# Patient Record
Sex: MALE | Race: WHITE | Employment: OTHER | URBAN - METROPOLITAN AREA
[De-identification: names, ages, dates, MRNs, and addresses within clinical notes are randomized per-mention and may not be internally consistent; named-entity substitution may affect disease eponyms.]

---

## 2019-04-02 ENCOUNTER — TRANSFERRED RECORDS (OUTPATIENT)
Dept: HEALTH INFORMATION MANAGEMENT | Facility: CLINIC | Age: 64
End: 2019-04-02

## 2019-04-15 ENCOUNTER — OFFICE VISIT (OUTPATIENT)
Dept: FAMILY MEDICINE | Facility: CLINIC | Age: 64
End: 2019-04-15
Payer: COMMERCIAL

## 2019-04-15 VITALS
OXYGEN SATURATION: 98 % | RESPIRATION RATE: 16 BRPM | DIASTOLIC BLOOD PRESSURE: 57 MMHG | SYSTOLIC BLOOD PRESSURE: 125 MMHG | HEIGHT: 66 IN | TEMPERATURE: 97.9 F | BODY MASS INDEX: 24.91 KG/M2 | HEART RATE: 73 BPM | WEIGHT: 155 LBS

## 2019-04-15 DIAGNOSIS — K57.32 DIVERTICULITIS OF COLON: ICD-10-CM

## 2019-04-15 DIAGNOSIS — Z90.49 S/P PARTIAL COLECTOMY: Primary | ICD-10-CM

## 2019-04-15 PROCEDURE — 99204 OFFICE O/P NEW MOD 45 MIN: CPT | Performed by: FAMILY MEDICINE

## 2019-04-15 ASSESSMENT — MIFFLIN-ST. JEOR: SCORE: 1430.83

## 2019-04-15 ASSESSMENT — PAIN SCALES - GENERAL: PAINLEVEL: MILD PAIN (2)

## 2019-04-15 NOTE — Clinical Note
02- Colonoscopy at [facility]/ [facility] everywhere / follow up 10 yrs

## 2019-04-15 NOTE — PROGRESS NOTES
"  SUBJECTIVE:   Kevin Sebastian is a 63 year old male who presents to clinic today for the following health issues:      Diverticulitis with perforated colon s/p partial colectomy.   Normal cbc/renal panel and lfts/lipase. ekg showed RBBB.   Given percocet and senokot at discharge.    History colonoscopy [facility] - no polyps.   History diverticulits x1 in past 3years ago.   Will likely get reanotsomis in 6months.   Colectomy in place. No pain. No bleeding. No fevers or chills. No urine changes. Appetite ok.  No current antibiotics. No nausea currently.   No chest pain or shortness of breath. Generally healthy.   No history mi/cva.   Emotionally doing.    Normal colonoscopy in past. Non-smoker. No history cancer.   Reviewed and updated as needed this visit by clinical staff         Reviewed and updated as needed this visit by Provider             Hospital Follow-up Visit:    Hospital/Nursing Home/IP Rehab Facility: [facility]  Date of Admission: 04-  Date of Discharge: 04-  Reason(s) for Admission: Diverticulitis        Coding guidelines for this visit:  Type of Medical   Decision Making Face-to-Face Visit       within 7 Days of discharge Face-to-Face Visit        within 14 days of discharge   Moderate Complexity [id number]   High Complexity [id number]              There is no problem list on file for this patient.    No past surgical history on file.    Social History     Tobacco Use     Smoking status: Never Smoker     Smokeless tobacco: Never Used   Substance Use Topics     Alcohol use: Yes     Alcohol/week: 8.4 oz     Types: 14 Cans of beer per week     History reviewed. No pertinent family history.        ROS:  All other ROS negative.     OBJECTIVE:     /57   Pulse 73   Temp 97.9  F (36.6  C) (Oral)   Resp 16   Ht 1.676 m (5' 6\")   Wt 70.3 kg (155 lb)   SpO2 98%   BMI 25.02 kg/m    Body mass index is 25.02 kg/m .  GENERAL: healthy, alert and no distress  EYES: Eyes " grossly normal to inspection, PERRL and conjunctivae and sclerae normal  HENT: ear canals and TM's normal, nose and mouth without ulcers or lesions  NECK: no adenopathy, no asymmetry, masses, or scars and thyroid normal to palpation  RESP: lungs clear to auscultation - no rales, rhonchi or wheezes  CV: regular rate and rhythm, normal S1 S2, no S3 or S4, no murmur, click or rub, no peripheral edema and peripheral pulses strong  ABDOMEN: soft, nontender, no hepatosplenomegaly, no masses and bowel sounds normal  ABDOMEN: incisions look clean. No active bleeding/pus. No surrounding redness.  MS: no gross musculoskeletal defects noted, no edema  NEURO: Normal strength and tone, mentation intact and speech normal  PSYCH: mentation appears normal, affect normal/bright      ASSESSMENT/PLAN:     ASSESSMENT / PLAN:  (Z90.49) S/P partial colectomy  (primary encounter diagnosis)  Comment: doing well. No signs of active bleeding/infection and appetite ok.   Plan: Expected course and warning signs reviewed. Call/email with questions/concerns. Signed up for my chart. Seeing surgery next week.     (K57.32) Diverticulitis of colon  Comment: s/p partial colonscopy  Plan: will try to get colonoscopy records but patient thinks no polyps.    Recheck in 6 months  For cpe/fasting labs/etc. Sooner if new issues/concerns.       Horace Rodriguez MD  [facility]

## 2019-04-23 ENCOUNTER — MEDICAL CORRESPONDENCE (OUTPATIENT)
Dept: HEALTH INFORMATION MANAGEMENT | Facility: CLINIC | Age: 64
End: 2019-04-23

## 2019-09-04 ENCOUNTER — TELEPHONE (OUTPATIENT)
Dept: FAMILY MEDICINE | Facility: CLINIC | Age: 64
End: 2019-09-04

## 2019-09-04 ENCOUNTER — OFFICE VISIT (OUTPATIENT)
Dept: FAMILY MEDICINE | Facility: CLINIC | Age: 64
End: 2019-09-04
Payer: COMMERCIAL

## 2019-09-04 VITALS
WEIGHT: 165 LBS | DIASTOLIC BLOOD PRESSURE: 78 MMHG | HEIGHT: 67 IN | BODY MASS INDEX: 25.9 KG/M2 | SYSTOLIC BLOOD PRESSURE: 128 MMHG | OXYGEN SATURATION: 97 % | HEART RATE: 65 BPM | TEMPERATURE: 98.5 F

## 2019-09-04 DIAGNOSIS — Z01.818 PREOP GENERAL PHYSICAL EXAM: Primary | ICD-10-CM

## 2019-09-04 DIAGNOSIS — Z87.19 HISTORY OF COLONIC DIVERTICULITIS: ICD-10-CM

## 2019-09-04 DIAGNOSIS — I45.10 RIGHT BUNDLE BRANCH BLOCK: ICD-10-CM

## 2019-09-04 DIAGNOSIS — Z93.3 S/P COLOSTOMY (H): ICD-10-CM

## 2019-09-04 PROCEDURE — 99214 OFFICE O/P EST MOD 30 MIN: CPT | Mod: 25 | Performed by: FAMILY MEDICINE

## 2019-09-04 PROCEDURE — 90471 IMMUNIZATION ADMIN: CPT | Performed by: FAMILY MEDICINE

## 2019-09-04 PROCEDURE — 93000 ELECTROCARDIOGRAM COMPLETE: CPT | Performed by: FAMILY MEDICINE

## 2019-09-04 PROCEDURE — 90714 TD VACC NO PRESV 7 YRS+ IM: CPT | Performed by: FAMILY MEDICINE

## 2019-09-04 RX ORDER — AMOXICILLIN 250 MG
1-4 CAPSULE ORAL DAILY
COMMUNITY
Start: 2019-04-10

## 2019-09-04 RX ORDER — DIPHENOXYLATE HYDROCHLORIDE AND ATROPINE SULFATE 2.5; .025 MG/1; MG/1
1 TABLET ORAL DAILY
COMMUNITY

## 2019-09-04 ASSESSMENT — MIFFLIN-ST. JEOR: SCORE: 1487.07

## 2019-09-04 NOTE — PROGRESS NOTES
[facility]  [address] [facility] [id number] [phone]  Dept: [phone]    PRE-OP EVALUATION:  Today's date: 2019     Kevin Sebastian (: 1955) presents for  pre-operative evaluation  assessment as requested by: Yadiel Howell MD    He requires evaluation and anesthesia risk assessment prior to undergoing surgery/procedure for take down of colostomy    Fax number for surgical facility: [facility]   Primary Physician: Horace Rodriguez  Type of Anesthesia Anticipated: General    Patient has a Health Care Directive or Living Will:  NO     Preop Questions 2019   Who is doing your surgery? [facility]   What are you having done? Take down colostomy    Date of Surgery/Procedure: 2019   Facility or Hospital where procedure/surgery will be performed: [facility]   1.  Do you have a history of Heart attack, stroke, stent, coronary bypass surgery, or other heart surgery? No   2.  Do you ever have any pain or discomfort in your chest? No   3.  Do you have a history of  Heart Failure? No   4.   Are you troubled by shortness of breath when:  walking on a level surface, or up a slight hill, or at night? No   5.  Do you currently have a cold, bronchitis or other respiratory infection? No   6.  Do you have a cough, shortness of breath, or wheezing? No   7.  Do you sometimes get pains in the calves of your legs when you walk? No   8. Do you or anyone in your family have previous history of blood clots? No   9.  Do you or does anyone in your family have a serious bleeding problem such as prolonged bleeding following surgeries or cuts? No   10. Have you ever had problems with anemia or been told to take iron pills? No   11. Have you had any abnormal blood loss such as black, tarry or bloody stools? No   12. Have you ever had a blood transfusion? No   13. Have you or any of your relatives ever had problems with anesthesia? No   14. Do you have sleep apnea,  excessive snoring or daytime drowsiness? YES -  snoring. Takes 10 minute naps frequently.   15. Do you have any prosthetic heart valves? No   16. Do you have prosthetic joints? No         HPI:     HPI related to upcoming procedure: diverticulitis and ended up with an emergency colectomy and ended up with a colostomy 4/2019  Doing well since then and now needed a take down colostomy       NO KNOWN CHRONIC MEDICAL PROBLEMS     MEDICAL HISTORY:   There are no active problems to display for this patient.    See above      No current outpatient medications on file.     OTC products: None, except as noted above    Allergies not on file   Latex Allergy: NO    Social History     Tobacco Use     Smoking status: Never Smoker     Smokeless tobacco: Never Used   Substance Use Topics     Alcohol use: Yes     Alcohol/week: 8.4 oz     Types: 14 Cans of beer per week     History   Drug Use Unknown       REVIEW OF SYSTEMS:   Constitutional, neuro, ENT, endocrine, pulmonary, cardiac, gastrointestinal, genitourinary, musculoskeletal, integument and psychiatric systems are negative, except as otherwise noted.    EXAM:   There were no vitals taken for this visit.    GENERAL APPEARANCE: healthy, alert and no distress     EYES: EOMI,  PERRL     HENT: ear canals and TM's normal and nose and mouth without ulcers or lesions     NECK: no adenopathy, no asymmetry, masses, or scars and thyroid normal to palpation     RESP: lungs clear to auscultation - no rales, rhonchi or wheezes     CV: regular rates and rhythm, normal S1 S2, no S3 or S4 and faint systolic murmur with occasional skipped beats, click or rub     ABDOMEN:  soft, nontender, no HSM or masses and bowel sounds normal     MS: extremities normal- no gross deformities noted, no evidence of inflammation in joints, FROM in all extremities.     SKIN: no suspicious lesions or rashes     NEURO: Normal strength and tone, sensory exam grossly normal, mentation intact and speech normal      PSYCH: mentation appears normal. and affect normal/bright     LYMPHATICS: No cervical adenopathy    DIAGNOSTICS:     EKG: normal axis, normal intervals, no acute ST/T changes c/w ischemia, no LVH by voltage criteria,   RIGHT BUNDLE BRANCH BLOCK    Labs Resulted Today:   Cbc bmp pending     IMPRESSION:   Reason for surgery/procedure: take down colostomy  Diagnosis/reason for consult: preop risk     The proposed surgical procedure is considered INTERMEDIATE risk.    REVISED CARDIAC RISK INDEX  The patient has the following serious cardiovascular risks for perioperative complications such as (MI, PE, VFib and 3  AV Block):    RBBB   Skipped beats      ICD-10-CM    1. Preop general physical exam Z01.818      ADMIN VACCINE, FIRST     Hepatitis C Screen Reflex to HCV RNA Quant and Genotype     CBC with platelets     Basic metabolic panel     TD PRESERV FREE, IM (7+ YRS)     EKG 12-lead complete w/read - Clinics     CARDIOLOGY EVAL ADULT REFERRAL   2. Right bundle branch block I45.10 CARDIOLOGY EVAL ADULT REFERRAL   3. S/P colostomy (H) Z93.3    4. History of colonic diverticulitis Z87.19          RECOMMENDATIONS:     Labs pending  Td updated  RBBB - patient reporting some signs or symptoms of ROBERTO. On auscultation faint heart murmur and skipped beats.  PATIENT does not come in for preventative visits although he did have a stress test in 2003 which result I am unable to access in Epic   Recommend Cardiology evaluation for further risk stratification and medical optimization.     Signed Electronically by: Tamiko Gore MD    Copy of this evaluation report is provided to requesting physician.    [facility] Preop Guidelines    Revised Cardiac Risk Index

## 2019-09-04 NOTE — NURSING NOTE
Prior to immunization administration, verified patients identity using patient s name and date of birth. Please see Immunization Activity for additional information.     Screening Questionnaire for Adult Immunization    Are you sick today?   No   Do you have allergies to medications, food, a vaccine component or latex?   No   Have you ever had a serious reaction after receiving a vaccination?   No   Do you have a long-term health problem with heart disease, lung disease, asthma, kidney disease, metabolic disease (e.g. diabetes), anemia, or other blood disorder?   No   Do you have cancer, leukemia, HIV/AIDS, or any other immune system problem?   No   In the past 3 months, have you taken medications that affect  your immune system, such as prednisone, other steroids, or anticancer drugs; drugs for the treatment of rheumatoid arthritis, Crohn s disease, or psoriasis; or have you had radiation treatments?   No   Have you had a seizure, or a brain or other nervous system problem?   No   During the past year, have you received a transfusion of blood or blood     products, or been given immune (gamma) globulin or antiviral drug?   No   For women: Are you pregnant or is there a chance you could become        pregnant during the next month?   No   Have you received any vaccinations in the past 4 weeks?   No     Immunization questionnaire answers were all negative.        Patient instructed to remain in clinic for 15 minutes afterwards, and to report any adverse reaction to me immediately.       Screening performed by Cierra Chavarria CMA on 9/4/2019 at 12:01 PM.

## 2019-09-04 NOTE — TELEPHONE ENCOUNTER
Patient was supposed to get labwork as well but it doesn't seem like this was done.  Please call patient and have him come in for labs preferably before his cardiology appointment so it will be in by the time he sees them  Thank you  Tamiko Gore M.D.

## 2019-09-05 NOTE — TELEPHONE ENCOUNTER
RECORDS RECEIVED FROM:   DATE RECEIVED:   NOTES STATUS DETAILS   OFFICE NOTE from referring provider    Internal 9-4-19 Dr. Gore   OFFICE NOTE from other cardiologist    N/A    DISCHARGE SUMMARY from hospital    N/A    DISCHARGE REPORT from the ER   N/A    OPERATIVE REPORT    N/A    MEDICATION LIST   Internal    LABS     BMP   Care Everywhere 4-7-19   CBC   Care Everywhere 4-7-19   CMP   Care Everywhere 4-1-19   Lipids   N/A    TSH   N/A    DIAGNOSTIC PROCEDURES     EKG   In process    Monitor Reports   N/A    IMAGING (DISC & REPORT)      Echo   N/A    Stress Tests   N/A    Cath   N/A    MRI/MRA   N/A    CT/CTA   N/A      Action    Action Taken 9-5: [facility]  9-5: Received strips and sent to scanning

## 2019-09-05 NOTE — PROGRESS NOTES
CARDIOLOGY CLINIC INITIAL CONSULTATION    Reason for consult: preoperative assessment prior to colostomy take down    HPI:  Mr. Sebastian is a 65 yo male with no known CVD history or traditional CVD risk factors. He was referred to cardiology due to a RBBB on his ECG.     He had a colectomy in April after an episode of diverticulitis. He has no perioperative CV complications. Although this event solidified his move into long-term, he remains active. 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 He goes for 30 minute bike rides 2-3 times/week. He denies any chest pain, dyspnea, palpitations, orthopnea, PND, LE edema, claudication.     His biggest health concern is difficulty with memory since retiring. He feels like his short term memory is worse. He feels some daytime fatigue and reports his wife has noticed that he snores at time. He has considered sleep testing in the past but has not scheduled anything.    Kevin is a never smoker and is on minimal medications.     ASSESSMENT AND PLAN  Mr. Kevin Sebastian is a 65 yo male in good cardiovascular health. He is active, able to perform well over 4 METS of activity without angina or heart failure symptoms. He is at low risk for a perioperative cardiovascular event. The RBBB on his ECG does not elevate that risk. He has no clear benefit from any additional cardiovascular workup prior to his surgery.     CVD Prevention: While Kevin has no striking CV risk factors and his blood pressure is well controlled, his age and sex are the primary drivers of increased risk. I will check a lipid panel today to help estimate his future risk. He would likely benefit from a moderate intensity statin which I am happy to prescribe. Alternatively, this decision could be made with his PCP, Dr. Rodriguez. Kevin would be a good candidate for evaluation in our [facility] if this is something he would like to  "pursue.     He describes some features of sleep apnea, so I encouraged him to discuss a sleep study referral from Dr. Rodriguez.     Finally, I will order a TSH to begin evaluation for memory issues. Additional workup can be pursued at the discretion of Dr. Rodriguez.    Thank you for the opportunity to participate in the care of Kevin Stanley. Please contact me with any additional questions or concerns. He can follow up with cardiology as needed.    Elijah Vegas MD    Preventive Cardiology  Pager: [phone]    PAST MEDICAL HISTORY:  Patient Active Problem List   Diagnosis     Benign prostatic hyperplasia     Dyslipidemia     Perforation of sigmoid colon due to diverticulitis     Osteoarthritis of both knees     No past medical history on file.    CURRENT MEDICATIONS:  Current Outpatient Medications   Medication Sig Dispense Refill     Multiple Vitamin (MULTI-VITAMINS) TABS Take 1 tablet by mouth daily       senna-docusate (SENOKOT-S/PERICOLACE) 8.6-50 MG tablet Take 1-4 tablets by mouth daily         PAST SURGICAL HISTORY:  No past surgical history on file.    ALLERGIES  Piperacillin-tazobactam in d5w    FAMILY HX:  Family History   Problem Relation Age of Onset     Other - See Comments Mother         valve replacement in 70s     Hyperlipidemia Mother        SOCIAL HX:  Social History     Tobacco Use     Smoking status: Never Smoker     Smokeless tobacco: Never Used   Substance Use Topics     Alcohol use: Yes     Alcohol/week: 8.4 oz     Types: 14 Cans of beer per week     Drug use: Never        ROS:  Comprehensive ROS is negative except as noted in HPI.    VITAL SIGNS:  /79 (BP Location: Left arm, Patient Position: Chair, Cuff Size: Adult Regular)   Pulse 64   Ht 1.702 m (5' 7\")   Wt 74.6 kg (164 lb 8 oz)   SpO2 97%   BMI 25.76 kg/m    Body mass index is 25.76 kg/m .  Wt Readings from Last 2 Encounters:   09/06/19 74.6 kg (164 lb 8 oz)   09/04/19 74.8 kg (165 lb)       PHYSICAL " EXAM  Gen: pleasant man sitting comfortably in NAD  Head: nc/at  Eyes: EOMI, PERRL  ENT: no OP lesions or erythema  Neck: supple, no LAD  CV: nml s1/s2, no murmur or gallop; no JVD  Chest: clear lungs  Abd: soft, NT, NABS  Ext: warm, no LE edema  Skin: no rash on limited exam  Neuro: awake, alert, oriented, nml speech and gait  Vasc: 2+ bilateral carotid, brachial, radial, DP and PT pulses; no bruits noted    LABS:   CMPNo lab results found.  CBCNo lab results found.  INRNo lab results found.  No lab results found.    Most recent EKG: personally reviewed and discussed with the patient   9/4/19 ECG: NSR, RBBB, no ischemia    Most recent ECHO:   NA    Most recent STRESS TEST:    2003 at [facility]: report not available.

## 2019-09-06 ENCOUNTER — OFFICE VISIT (OUTPATIENT)
Dept: CARDIOLOGY | Facility: CLINIC | Age: 64
End: 2019-09-06
Attending: FAMILY MEDICINE
Payer: COMMERCIAL

## 2019-09-06 ENCOUNTER — PRE VISIT (OUTPATIENT)
Dept: CARDIOLOGY | Facility: CLINIC | Age: 64
End: 2019-09-06

## 2019-09-06 VITALS
BODY MASS INDEX: 25.82 KG/M2 | WEIGHT: 164.5 LBS | DIASTOLIC BLOOD PRESSURE: 79 MMHG | HEIGHT: 67 IN | OXYGEN SATURATION: 97 % | HEART RATE: 64 BPM | SYSTOLIC BLOOD PRESSURE: 134 MMHG

## 2019-09-06 DIAGNOSIS — R53.83 FATIGUE, UNSPECIFIED TYPE: ICD-10-CM

## 2019-09-06 DIAGNOSIS — Z01.810 PRE-OPERATIVE CARDIOVASCULAR EXAMINATION: Primary | ICD-10-CM

## 2019-09-06 DIAGNOSIS — Z01.818 PREOP GENERAL PHYSICAL EXAM: ICD-10-CM

## 2019-09-06 DIAGNOSIS — Z01.810 PRE-OPERATIVE CARDIOVASCULAR EXAMINATION: ICD-10-CM

## 2019-09-06 PROBLEM — K57.20 PERFORATION OF SIGMOID COLON DUE TO DIVERTICULITIS: Status: ACTIVE | Noted: 2019-09-06

## 2019-09-06 LAB
ALBUMIN SERPL-MCNC: 4.2 G/DL (ref 3.4–5)
ALP SERPL-CCNC: 63 U/L (ref 40–150)
ALT SERPL W P-5'-P-CCNC: 27 U/L (ref 0–70)
ANION GAP SERPL CALCULATED.3IONS-SCNC: 5 MMOL/L (ref 3–14)
AST SERPL W P-5'-P-CCNC: 21 U/L (ref 0–45)
BILIRUB SERPL-MCNC: 0.9 MG/DL (ref 0.2–1.3)
BUN SERPL-MCNC: 18 MG/DL (ref 7–30)
CALCIUM SERPL-MCNC: 9.1 MG/DL (ref 8.5–10.1)
CHLORIDE SERPL-SCNC: 107 MMOL/L (ref 94–109)
CHOLEST SERPL-MCNC: 197 MG/DL
CO2 SERPL-SCNC: 26 MMOL/L (ref 20–32)
CREAT SERPL-MCNC: 0.98 MG/DL (ref 0.66–1.25)
ERYTHROCYTE [DISTWIDTH] IN BLOOD BY AUTOMATED COUNT: 12.5 % (ref 10–15)
GFR SERPL CREATININE-BSD FRML MDRD: 79 ML/MIN/{1.73_M2}
GLUCOSE SERPL-MCNC: 97 MG/DL (ref 70–99)
HCT VFR BLD AUTO: 47.2 % (ref 40–53)
HCV AB SERPL QL IA: NONREACTIVE
HDLC SERPL-MCNC: 49 MG/DL
HGB BLD-MCNC: 15.6 G/DL (ref 13.3–17.7)
LDLC SERPL CALC-MCNC: 129 MG/DL
MCH RBC QN AUTO: 31.2 PG (ref 26.5–33)
MCHC RBC AUTO-ENTMCNC: 33.1 G/DL (ref 31.5–36.5)
MCV RBC AUTO: 94 FL (ref 78–100)
NONHDLC SERPL-MCNC: 148 MG/DL
PLATELET # BLD AUTO: 155 10E9/L (ref 150–450)
POTASSIUM SERPL-SCNC: 4.3 MMOL/L (ref 3.4–5.3)
PROT SERPL-MCNC: 7.7 G/DL (ref 6.8–8.8)
RBC # BLD AUTO: 5 10E12/L (ref 4.4–5.9)
SODIUM SERPL-SCNC: 138 MMOL/L (ref 133–144)
TRIGL SERPL-MCNC: 92 MG/DL
TSH SERPL DL<=0.005 MIU/L-ACNC: 2.38 MU/L (ref 0.4–4)
WBC # BLD AUTO: 5.8 10E9/L (ref 4–11)

## 2019-09-06 PROCEDURE — 85027 COMPLETE CBC AUTOMATED: CPT | Performed by: INTERNAL MEDICINE

## 2019-09-06 PROCEDURE — 80053 COMPREHEN METABOLIC PANEL: CPT | Performed by: INTERNAL MEDICINE

## 2019-09-06 PROCEDURE — 99204 OFFICE O/P NEW MOD 45 MIN: CPT | Mod: ZP | Performed by: INTERNAL MEDICINE

## 2019-09-06 PROCEDURE — 80061 LIPID PANEL: CPT | Performed by: INTERNAL MEDICINE

## 2019-09-06 PROCEDURE — 84443 ASSAY THYROID STIM HORMONE: CPT | Performed by: INTERNAL MEDICINE

## 2019-09-06 PROCEDURE — 86803 HEPATITIS C AB TEST: CPT | Performed by: INTERNAL MEDICINE

## 2019-09-06 PROCEDURE — 36415 COLL VENOUS BLD VENIPUNCTURE: CPT | Performed by: INTERNAL MEDICINE

## 2019-09-06 ASSESSMENT — ENCOUNTER SYMPTOMS
DISTURBANCES IN COORDINATION: 0
DIZZINESS: 0
MYALGIAS: 1
MEMORY LOSS: 1
TINGLING: 1
EXERCISE INTOLERANCE: 0
INCREASED ENERGY: 1
NIGHT SWEATS: 0
MUSCLE WEAKNESS: 1
LIGHT-HEADEDNESS: 0
TREMORS: 0
JOINT SWELLING: 0
SPEECH CHANGE: 0
WEAKNESS: 1
CHILLS: 0
HEMOPTYSIS: 0
HYPOTENSION: 0
DECREASED APPETITE: 0
SPUTUM PRODUCTION: 0
SEIZURES: 0
POLYPHAGIA: 0
HYPERTENSION: 0
HALLUCINATIONS: 0
COUGH: 0
WEIGHT GAIN: 0
NECK PAIN: 1
WEIGHT LOSS: 0
LOSS OF CONSCIOUSNESS: 0
FEVER: 0
DYSPNEA ON EXERTION: 0
WHEEZING: 0
ARTHRALGIAS: 1
POLYDIPSIA: 0
POSTURAL DYSPNEA: 0
SNORES LOUDLY: 1
SHORTNESS OF BREATH: 0
ORTHOPNEA: 0
ALTERED TEMPERATURE REGULATION: 0
COUGH DISTURBING SLEEP: 0
PALPITATIONS: 1
BACK PAIN: 0
SYNCOPE: 0
HEADACHES: 0
SLEEP DISTURBANCES DUE TO BREATHING: 0
FATIGUE: 1
LEG PAIN: 0
MUSCLE CRAMPS: 0

## 2019-09-06 ASSESSMENT — MIFFLIN-ST. JEOR: SCORE: 1484.8

## 2019-09-06 ASSESSMENT — PAIN SCALES - GENERAL: PAINLEVEL: NO PAIN (0)

## 2019-09-06 NOTE — NURSING NOTE
Vitals completed successfully and medication reconciled.     Claire Pastor CMA  8:12 AM    Chief Complaint   Patient presents with     New Patient     reason for visit: present for cardiac clearance for colostomy closure

## 2019-09-06 NOTE — LETTER
9/6/2019      RE: Kevin Sebastian  [address] [facility] [address]       Dear Colleague,    Thank you for the opportunity to participate in the care of your patient, Kevin Sebastian, at the [facility]. Please see a copy of my visit note below.    CARDIOLOGY CLINIC INITIAL CONSULTATION    Reason for consult: preoperative assessment prior to colostomy take down    HPI:  Mr. Sebastian is a 63 yo male with no known CVD history or traditional CVD risk factors. He was referred to cardiology due to a RBBB on his ECG.     He had a colectomy in April after an episode of diverticulitis. He has no perioperative CV complications. Although this event solidified his move into [facility], he remains active. 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 He goes for 30 minute bike rides 2-3 times/week. He denies any chest pain, dyspnea, palpitations, orthopnea, PND, LE edema, claudication.     His biggest health concern is difficulty with memory since retiring. He feels like his short term memory is worse. He feels some daytime fatigue and reports his wife has noticed that he snores at time. He has considered sleep testing in the past but has not scheduled anything.    Kevin is a never smoker and is on minimal medications.     ASSESSMENT AND PLAN  Mr. Kevin Sebastian is a 63 yo male in good cardiovascular health. He is active, able to perform well over 4 METS of activity without angina or heart failure symptoms. He is at low risk for a perioperative cardiovascular event. The RBBB on his ECG does not elevate that risk. He has no clear benefit from any additional cardiovascular workup prior to his surgery.     CVD Prevention: While Kevin has no striking CV risk factors and his blood pressure is well controlled, his age and sex are the primary drivers of increased risk. I will check a lipid panel  today to help estimate his future risk. He would likely benefit from a moderate intensity statin which I am happy to prescribe. Alternatively, this decision could be made with his PCP, Dr. Rodriguez. Kevin would be a good candidate for evaluation in our [facility] if this is something he would like to pursue.     He describes some features of sleep apnea, so I encouraged him to discuss a sleep study referral from Dr. Rodriguez.     Finally, I will order a TSH to begin evaluation for memory issues. Additional workup can be pursued at the discretion of Dr. Rodriguez.    Thank you for the opportunity to participate in the care of Kevin Stanley. Please contact me with any additional questions or concerns. He can follow up with cardiology as needed.    Elijah Vegas MD    Preventive Cardiology  Pager: [phone]    PAST MEDICAL HISTORY:  Patient Active Problem List   Diagnosis     Benign prostatic hyperplasia     Dyslipidemia     Perforation of sigmoid colon due to diverticulitis     Osteoarthritis of both knees     No past medical history on file.    CURRENT MEDICATIONS:  Current Outpatient Medications   Medication Sig Dispense Refill     Multiple Vitamin (MULTI-VITAMINS) TABS Take 1 tablet by mouth daily       senna-docusate (SENOKOT-S/PERICOLACE) 8.6-50 MG tablet Take 1-4 tablets by mouth daily         PAST SURGICAL HISTORY:  No past surgical history on file.    ALLERGIES  Piperacillin-tazobactam in d5w    FAMILY HX:  Family History   Problem Relation Age of Onset     Other - See Comments Mother         valve replacement in 70s     Hyperlipidemia Mother        SOCIAL HX:  Social History     Tobacco Use     Smoking status: Never Smoker     Smokeless tobacco: Never Used   Substance Use Topics     Alcohol use: Yes     Alcohol/week: 8.4 oz     Types: 14 Cans of beer per week     Drug use: Never        ROS:  Comprehensive ROS is negative except as noted in HPI.    VITAL SIGNS:  /79 (BP  "Location: Left arm, Patient Position: Chair, Cuff Size: Adult Regular)   Pulse 64   Ht 1.702 m (5' 7\")   Wt 74.6 kg (164 lb 8 oz)   SpO2 97%   BMI 25.76 kg/m     Body mass index is 25.76 kg/m .  Wt Readings from Last 2 Encounters:   09/06/19 74.6 kg (164 lb 8 oz)   09/04/19 74.8 kg (165 lb)       PHYSICAL EXAM  Gen: pleasant man sitting comfortably in NAD  Head: nc/at  Eyes: EOMI, PERRL  ENT: no OP lesions or erythema  Neck: supple, no LAD  CV: nml s1/s2, no murmur or gallop; no JVD  Chest: clear lungs  Abd: soft, NT, NABS  Ext: warm, no LE edema  Skin: no rash on limited exam  Neuro: awake, alert, oriented, nml speech and gait  Vasc: 2+ bilateral carotid, brachial, radial, DP and PT pulses; no bruits noted    LABS:   CMPNo lab results found.  CBCNo lab results found.  INRNo lab results found.  No lab results found.    Most recent EKG: personally reviewed and discussed with the patient   9/4/19 ECG: NSR, RBBB, no ischemia    Most recent ECHO:   NA    Most recent STRESS TEST:    2003 at [facility]: report not available.      Please do not hesitate to contact me if you have any questions/concerns.     Sincerely,     Elijah Vegas MD    "

## 2019-09-06 NOTE — PATIENT INSTRUCTIONS
"You were seen today in the Cardiovascular Clinic at the [facility].     Cardiology Providers you saw during your visit: Dr. Ghada Vegas     Diagnosis:   Encounter Diagnoses   Name Primary?     Pre-operative cardiovascular examination Yes     Fatigue, unspecified type         Results: Discussed with you today EKG      Orders:   Orders Placed This Encounter   Procedures     Lipid panel reflex to direct LDL Fasting     TSH with free T4 reflex     Comprehensive metabolic panel       Current Medication List  Current Outpatient Medications   Medication Sig Dispense Refill     Multiple Vitamin (MULTI-VITAMINS) TABS Take 1 tablet by mouth daily       senna-docusate (SENOKOT-S/PERICOLACE) 8.6-50 MG tablet Take 1-4 tablets by mouth daily         Recommendations:   1. Get your cholesterol, electrolytes, liver function and thyroid labs checked today.  2. Talk with Dr. Rodriguez about a sleep study.  3. Continue to walk or ride your bike at least every other day.  4. Mediterranean diet.  5. Follow up in cardiology clinic as needed.     Please feel free to call me with any questions or concerns.       Mihai Sequeira LPN     Questions: [phone].   First press #1 for the [employer] and then press #3 for \"Medical Questions\" to reach us Cardiology Nurses.     [phone].   First press #1 for the [employer] and then press #1     On Call Cardiologist for after hours or on weekends: [phone]   option #4 and ask to speak to the on-call Cardiologist.          If you need a medication refill please contact your pharmacy.  Please allow 3 business days for your refill to be completed.  ________________________________________________________________________________________________________________________________      Patient Education     Mediterranean Diet  A heart healthy eating plan  The Mediterranean Diet is based on the eating habits of people in countries near the [place]. People living in " this part of the world have long lives and low rates of chronic diseases. They have lower rates of death from heart disease, cancer and other illnesses.  When you follow this eating plan you'll have better control of your blood sugar and weight. The plan requires simple changes in your habits of eating, and the whole family can take part.  Mediterranean lifestyle   Enjoy eating with others. Sit at the table with family and friends and enjoy your meal. When you eat slowly, you are able to tune in to your body's hunger and fullness signals. You're less likely to overeat.  Be physically active: Being active every day is important for overall good health. Run, walk, dance and do lighter activities such as house and yard work. Move more and sit less!  Drink plenty of water during the day.  Drink red wine with meals in modest amounts (optional).  The Mediterranean Pyramid   The pyramid shows the food groups and the amounts eaten in relation to the whole diet. It is more than a diet; it is also a life-style plan.  The largest food group at bottom contains plant foods: vegetables, fruits, grains, nuts, legumes, seeds, olives and olive oil. These foods make up the largest part of your meals.   The next groups above: fish and seafood, then poultry, cheese, eggs and yogurt are eaten less often and in smaller servings.   The top group, meats and sweets, are eaten the least often and in the smallest amounts.    Tips for adding plant foods to your meals   Vegetables and fruits:     Aim for 3 to 8 servings each day. A serving is   to 2 cups, depending on the food.    Choose a variety of colors. Big green salads are a great way to include several vegetable servings.    Try fresh fruit as a dessert: oranges, grapes, apples pomegranates or fresh figs.    At home keep fresh fruit in a bowl to tempt family.    Bring fruit and vegetables to work for a snack.  Oil     Replace butter and margarine with healthy fats such as olive oil.  "Other plant-based oils are canola, walnut and peanut oil. These are high in good fats. Use them in cooking, salad dressings and baking.    Drizzle your bread with olive oil instead of butter or margarine. Use herbs and spices to add flavor and aroma and to reduce fat and salt when cooking.  Whole grains    Look for the term \"whole\" or \"whole grain\" on the package. Processing grains removes vitamins, minerals and fiber. Whole grains may include: corn, wheat, oats, rye, rice and barley.    Examples of Mediterranean grains include: barley, farro, buckwheat, bulgur, couscous, and wheatberries.    Slowly switch to a whole grain by using whole-grain blends of pastas and rice. Or mix whole grains with refined; for example, mix whole-wheat pasta with white pasta.  Beans and legumes     Beans are a good source of protein and fiber, adding flavor and texture to dishes. Examples include cannellini beans, chickpea, kye beans, green beans, kidney beans, lentils and split peas.    Cook a vegetarian meal one night a week: use beans or legumes with vegetables and grains.    Nuts are high in healthy fats. Try walnuts, almonds, pine nuts, hazelnuts and cashews. Avoid candied, honey-roasted and heavily salted nuts.    Limit your intake of nuts to a small handful each day. Explore ways to add to nuts to salads and other dishes.  Tips for using fish and seafood in your meals    Aim for meals with fish or shellfish at least twice a week.    Tuna, herring, salmon and sardines are rich in heart-healthy omega-3. Shellfish, such as mussels, oysters and clams, have similar benefits for brain and heart health.  Tips for using poultry, eggs and cheese and yogurt in your meals    Eat poultry or eggs at least twice a week.    Roast, broil or grill your poultry. Season with fresh or dried herbs.    Enjoy low-fat cheese or yogurt every day.  Tips for using red meat and sweets in your meals     Limit lean red meat to one time a week or 4 times a " month.    Red meat has more saturated fats. Choose a lean cut, like top loin, sirloin, flank steak, strip steak or 90% lean ground beef.    Limit portions to 3 to 4 ounces.    Make whole grains and vegetables the main focus of a meal. Add meat in small amounts for flavor.    Limit sweets such as ice cream or cookies for a special times or holidays.  Snacks    Snack on a handful of almonds, walnuts or sunflower seeds in place of chips, cookies or other processed snack foods.    Calcium-rich, low-fat cheese or low- and nonfat plain yogurt with fresh fruit are healthy snacks that are easy to take with you.  Like to know more?  For tips on shoppping, cooking and eating well the Mediterranean way, go to the [employer] website at www.[employer].org.  For informational purposes only. Not to replace the advice of your health care provider.   Copyright   2014 [employer]. All rights reserved.   Mediterranean pyramrid used with permission of the [employer]. [employer] 894388 - 09/15.  For informational purposes only. Not to replace the advice of your health care provider.  Copyright   2018 [employer]. All rights reserved.

## 2019-09-11 ENCOUNTER — TELEPHONE (OUTPATIENT)
Dept: CARDIOLOGY | Facility: CLINIC | Age: 64
End: 2019-09-11

## 2019-09-11 NOTE — TELEPHONE ENCOUNTER
----- Message from Elijah Vegas MD sent at 9/9/2019  2:37 PM CDT -----  LDL cholesterol was mildly elevated. The rest of the results were normal and were released to the patient via [employer].

## 2019-09-17 ENCOUNTER — TELEPHONE (OUTPATIENT)
Dept: FAMILY MEDICINE | Facility: CLINIC | Age: 64
End: 2019-09-17

## 2019-09-17 NOTE — TELEPHONE ENCOUNTER
Fax number [phone]     Reason for Call:  EKG     Detailed comments: would like EKG results faxed to [phone]     Phone Number Patient can be reached at: Other phone number:  [phone]     Best Time: anytime    Can we leave a detailed message on this number? NO    Call taken on 9/17/2019 at 9:48 AM by Lindsey Madsen

## 2019-09-25 ENCOUNTER — TRANSFERRED RECORDS (OUTPATIENT)
Dept: HEALTH INFORMATION MANAGEMENT | Facility: CLINIC | Age: 64
End: 2019-09-25

## 2019-09-30 LAB
CREAT SERPL-MCNC: 0.79 MG/DL (ref 0.72–1.25)
GFR SERPL CREATININE-BSD FRML MDRD: >60 ML/MIN/1.73M2
GLUCOSE SERPL-MCNC: 109 MG/DL (ref 65–100)
POTASSIUM SERPL-SCNC: 4.3 MMOL/L (ref 3.5–5)

## 2019-10-04 NOTE — PROGRESS NOTES
Subjective     Kevin Sebastian is a 64 year old male who presents to clinic today for the following health issues:    [facility]       Hospital Follow-up Visit:    Hospital/Nursing Home/ Rehab Facility: [facility]  Date of Admission: 9/25/19  Date of Discharge: 10/4/19  Reason(s) for Admission: Perforation of sigmoid colon due to diverticulitis             Problems taking medications regularly:  None       Medication changes since discharge: None       Problems adhering to non-medication therapy:  None    Patient has questions about bowel movements  Summary of hospitalization:  [facility] discharge summary reviewed  Diagnostic Tests/Treatments reviewed.  Follow up needed: none  Other Healthcare Providers Involved in Patient s Care:         Surgical follow-up appointment - 101/10/2019  Update since discharge: improved.     Post Discharge Medication Reconciliation: discharge medications reconciled, continue medications without change.  Plan of care communicated with patient     Coding guidelines for this visit:  Type of Medical   Decision Making Face-to-Face Visit       within 7 Days of discharge Face-to-Face Visit        within 14 days of discharge   Moderate Complexity [id number]   High Complexity [id number]            Constipation     Onset: since discharge form the hospital    Description:   Frequency of bowel movements: had one small bowel movement yesterday  Stool consistency: small caliber    Progression of Symptoms:  improving    Accompanying Signs & Symptoms:  Abdominal pain (cramping?): YES  Blood in stool: no   Rectal pain: no   Nausea/vomiting: no   Weight loss or gain: no    History:   History of abdominal surgery: YES    Precipitating factors:   Recent use of narcotics, anticholinergics, calcium channel blockers, antacids, or iron supplements: no   Chronic Laxative Use: YES         Therapies Tried and outcome: laxatives      Patient Active Problem List   Diagnosis     Benign prostatic hyperplasia      Dyslipidemia     Perforation of sigmoid colon due to diverticulitis     Osteoarthritis of both knees     History reviewed. No pertinent surgical history.    Social History     Tobacco Use     Smoking status: Never Smoker     Smokeless tobacco: Never Used   Substance Use Topics     Alcohol use: Yes     Alcohol/week: 14.0 standard drinks     Types: 14 Cans of beer per week     Family History   Problem Relation Age of Onset     Other - See Comments Mother         valve replacement in 70s     Hyperlipidemia Mother          Current Outpatient Medications   Medication Sig Dispense Refill     Multiple Vitamin (MULTI-VITAMINS) TABS Take 1 tablet by mouth daily       senna-docusate (SENOKOT-S/PERICOLACE) 8.6-50 MG tablet Take 1-4 tablets by mouth daily       Allergies   Allergen Reactions     Piperacillin-Tazobactam In D5w Other (See Comments)     Red, watery, burning eyes.  Intermittent total body pruritis.  PRN IV benadryl managed.      Recent Labs   Lab Test 09/06/19  0907   *   HDL 49   TRIG 92   ALT 27   CR 0.98   GFRESTIMATED 79   GFRESTBLACK >90   POTASSIUM 4.3   TSH 2.38      BP Readings from Last 3 Encounters:   10/07/19 120/74   09/06/19 134/79   09/04/19 128/78    Wt Readings from Last 3 Encounters:   10/07/19 71.2 kg (157 lb)   09/06/19 74.6 kg (164 lb 8 oz)   09/04/19 74.8 kg (165 lb)                    Reviewed and updated as needed this visit by Provider  Tobacco  Allergies  Meds  Problems  Med Hx  Surg Hx  Fam Hx         Review of Systems   Constitutional: Negative for activity change, appetite change, chills, diaphoresis and fatigue.   Eyes: Negative.    Respiratory: Negative for apnea, cough, choking and chest tightness.    Cardiovascular: Negative for chest pain, palpitations and peripheral edema.   Gastrointestinal: Positive for constipation. Negative for abdominal distention, abdominal pain, anal bleeding, diarrhea, heartburn, hematochezia, nausea, rectal pain and vomiting.   Endocrine:  Negative.    Genitourinary: Negative.    Musculoskeletal: Negative.    Skin: Negative.    Allergic/Immunologic: Negative.    Hematological: Negative.    Psychiatric/Behavioral: Negative.             Objective    /74   Pulse 78   Temp 97.6  F (36.4  C) (Oral)   Wt 71.2 kg (157 lb)   SpO2 97%   BMI 24.59 kg/m    Body mass index is 24.59 kg/m .  Physical Exam  Vitals signs and nursing note reviewed.   Constitutional:       General: He is not in acute distress.     Appearance: Normal appearance. He is not ill-appearing or toxic-appearing.   Cardiovascular:      Rate and Rhythm: Normal rate.      Pulses: Normal pulses.      Heart sounds: No murmur. No gallop.    Pulmonary:      Effort: Pulmonary effort is normal. No respiratory distress.      Breath sounds: Normal breath sounds. No stridor. No wheezing or rales.   Abdominal:      General: Abdomen is flat. Bowel sounds are normal. There is no distension.      Palpations: Abdomen is soft. There is no mass.      Tenderness: There is no tenderness. There is no right CVA tenderness, left CVA tenderness, guarding or rebound.      Hernia: No hernia is present.       Genitourinary:     Rectum: Normal.      Comments: Rectal exam performed showed normal rectal tone. No bulk stool in the rectum. No blood.   Skin:     Capillary Refill: Capillary refill takes less than 2 seconds.   Neurological:      General: No focal deficit present.      Mental Status: He is alert.                    Assessment & Plan     1. Hospital discharge follow-up  Patient is 64-year-old male with history of diverticulitis.  He had a history of bowel perforation on 04/2019 status post colectomy.  She had Exploratory laparotomy, sigmoid colectomy, Fidencio pouch, end colostomy.  [facility] for[facility] with handsewn colonic anastomosis, mobilization of the splenic flexure.  Discharged on 10/04/2019  Today patient states he is doing  "well.  Had a small bowel movement yesterday.  Is any nausea vomiting blood in stool.  He has a follow-up with general surgery on 10/10/2019.  Exam today showed patient is in acute distress, alert oriented, vital signs are stable.  Rectal exam showed: Normal rectal tone, no bulk stool in the rectal vault, there is a very small amount of stool in the rectum.  Advised patient to increase fluid intake.  Avoid sugar in his diet.  Increase fiber intake.  Over-the-counter milk of magnesia as needed.  Red flag and warning signs discussed with the patient including nausea vomiting, abdominal pain not passing gas or stool.  If he has any of these symptoms advised to go to the ER  Patient verbalized understanding and agreed on the plan of care.      2. Other constipation  Last bowel movement was yesterday.  He passed a small amount of stool.  Will obtain x-ray abdomen.  Advised patient to use milk of magnesia as needed  - XR Abdomen 2 Views; Future    3. Ileus following gastrointestinal surgery (H)  See problem 1.       BMI:   Estimated body mass index is 24.59 kg/m  as calculated from the following:    Height as of 9/6/19: 1.702 m (5' 7\").    Weight as of this encounter: 71.2 kg (157 lb).           Work on weight loss  Regular exercise    Return if symptoms worsen or fail to improve.    Barbie Thomas MD  [facility]    "

## 2019-10-07 ENCOUNTER — OFFICE VISIT (OUTPATIENT)
Dept: FAMILY MEDICINE | Facility: CLINIC | Age: 64
End: 2019-10-07
Payer: COMMERCIAL

## 2019-10-07 ENCOUNTER — ANCILLARY PROCEDURE (OUTPATIENT)
Dept: GENERAL RADIOLOGY | Facility: CLINIC | Age: 64
End: 2019-10-07
Attending: FAMILY MEDICINE
Payer: COMMERCIAL

## 2019-10-07 VITALS
BODY MASS INDEX: 24.59 KG/M2 | DIASTOLIC BLOOD PRESSURE: 74 MMHG | WEIGHT: 157 LBS | TEMPERATURE: 97.6 F | OXYGEN SATURATION: 97 % | SYSTOLIC BLOOD PRESSURE: 120 MMHG | HEART RATE: 78 BPM

## 2019-10-07 DIAGNOSIS — K59.09 OTHER CONSTIPATION: ICD-10-CM

## 2019-10-07 DIAGNOSIS — K56.7 ILEUS FOLLOWING GASTROINTESTINAL SURGERY (H): ICD-10-CM

## 2019-10-07 DIAGNOSIS — K91.89 ILEUS FOLLOWING GASTROINTESTINAL SURGERY (H): ICD-10-CM

## 2019-10-07 DIAGNOSIS — Z09 HOSPITAL DISCHARGE FOLLOW-UP: Primary | ICD-10-CM

## 2019-10-07 PROCEDURE — 74019 RADEX ABDOMEN 2 VIEWS: CPT

## 2019-10-07 PROCEDURE — 99213 OFFICE O/P EST LOW 20 MIN: CPT | Performed by: FAMILY MEDICINE

## 2019-10-07 ASSESSMENT — ENCOUNTER SYMPTOMS
DIARRHEA: 0
CHILLS: 0
CONSTIPATION: 1
APNEA: 0
ACTIVITY CHANGE: 0
COUGH: 0
ABDOMINAL DISTENTION: 0
PSYCHIATRIC NEGATIVE: 1
EYES NEGATIVE: 1
ANAL BLEEDING: 0
HEMATOCHEZIA: 0
DIAPHORESIS: 0
CHEST TIGHTNESS: 0
HEARTBURN: 0
ABDOMINAL PAIN: 0
MUSCULOSKELETAL NEGATIVE: 1
RECTAL PAIN: 0
ALLERGIC/IMMUNOLOGIC NEGATIVE: 1
APPETITE CHANGE: 0
VOMITING: 0
CHOKING: 0
ENDOCRINE NEGATIVE: 1
NAUSEA: 0
PALPITATIONS: 0
FATIGUE: 0
HEMATOLOGIC/LYMPHATIC NEGATIVE: 1

## 2019-10-07 NOTE — PATIENT INSTRUCTIONS
Patient Education     Constipation (Adult)  Constipation means that you have bowel movements that are less frequent than usual. Stools often become very hard and difficult to pass.  Constipation is very common. At some point in life, it affects almost everyone. Since everyone's bowel habits are different, what is constipation to one person may not be to another. Your healthcare provider may do tests to diagnose constipation. It depends on what he or she finds when evaluating you.    Symptoms of constipation include:    Abdominal pain    Bloating    Vomiting    Painful bowel movements    Itching, swelling, bleeding, or pain around the anus  Causes  Constipation can have many causes. These include:    Diet low in fiber    Too much dairy    Not drinking enough liquids    Lack of exercise or physical activity (especially true for older adults)    Changes in lifestyle or daily routine, including pregnancy, aging, work, and travel    Frequent use or misuse of laxatives    Ignoring the urge to have a bowel movement or delaying it until later    Medicines, such as certain prescription pain medicines, iron supplements, antacids, certain antidepressants, and calcium supplements    Diseases like irritable bowel syndrome, bowel obstructions, stroke, diabetes, thyroid disease, Parkinson disease, hemorrhoids, and colon cancer  Complications  Potential complications of constipation can include:    Hemorrhoids    Rectal bleeding from hemorrhoids or anal fissures (skin tears)    Hernias    Dependency on laxatives    Chronic constipation    Fecal impaction, a severe form of constipation in which a large amount of hard stool is in your rectum that you can't pass    Bowel obstruction or perforation  Home care  All treatment should be done after talking with your healthcare provider. This is especially true if you have another medical problems, are taking prescription medicines, or are an older adult. Treatment most often involves  lifestyle changes. You may also need medicines. Your healthcare provider will tell you which will work best for you. Follow the advice below to help avoid this problem in the future.  Lifestyle changes  These lifestyle changes can help prevent constipation:    Diet. Eat a high-fiber diet, with fresh fruit and vegetables, and reduce dairy intake, meats, and processed foods    Fluids. It's important to get enough fluids each day. Drink plenty of water when you eat more fiber. If you are on diet that limits the amount of fluid you can have, talk about this with your healthcare provider.    Regular exercise. Check with your healthcare provider first.  Medicines  Take any medicines as directed. Some laxatives are safe to use only every now and then. Others can be taken on a regular basis. While laxatives don't cause bowel dependence, they are treating the symptoms. So your constipation may return if you don't make other changes. Talk with your healthcare provider or pharmacist if you have questions.  Prescription pain medicines can cause constipation. If you are taking this kind of medicine, ask your healthcare provider if you should also take a stool softener.  Medicines you may take to treat constipation include:    Fiber supplements    Stool softeners    Laxatives    Enemas    Rectal suppositories  Follow-up care  Follow up with your healthcare provider if symptoms don't get better in the next few days. You may need to have more tests or see a specialist.  Call 911  Call 911 if any of these occur:    Trouble breathing    Stiff, rigid abdomen that is severely painful to touch    Confusion    Fainting or loss of consciousness    Rapid heart rate    Chest pain  When to seek medical advice  Call your healthcare provider right away if any of these occur:    Fever of 100.4 F (38 C) or higher, or as directed by your healthcare provider    Failure to resume normal bowel movements    Pain in your abdomen or back gets  worse    Nausea or vomiting    Swelling in your abdomen    Blood in the stool    Black, tarry stool    Involuntary weight loss    Weakness  Date Last Reviewed: 6/1/2018 2000-2018 [employer]. [address] [place] [address], [place] [address]. All rights reserved. This information is not intended as a substitute for professional medical care. Always follow your healthcare professional's instructions.

## 2020-03-11 ENCOUNTER — HEALTH MAINTENANCE LETTER (OUTPATIENT)
Age: 65
End: 2020-03-11

## 2021-01-03 ENCOUNTER — HEALTH MAINTENANCE LETTER (OUTPATIENT)
Age: 66
End: 2021-01-03

## 2021-04-25 ENCOUNTER — HEALTH MAINTENANCE LETTER (OUTPATIENT)
Age: 66
End: 2021-04-25

## 2021-10-10 ENCOUNTER — HEALTH MAINTENANCE LETTER (OUTPATIENT)
Age: 66
End: 2021-10-10

## 2022-05-21 ENCOUNTER — HEALTH MAINTENANCE LETTER (OUTPATIENT)
Age: 67
End: 2022-05-21

## 2022-09-18 ENCOUNTER — HEALTH MAINTENANCE LETTER (OUTPATIENT)
Age: 67
End: 2022-09-18

## 2023-06-04 ENCOUNTER — HEALTH MAINTENANCE LETTER (OUTPATIENT)
Age: 68
End: 2023-06-04